# Patient Record
Sex: FEMALE | Race: WHITE | NOT HISPANIC OR LATINO
[De-identification: names, ages, dates, MRNs, and addresses within clinical notes are randomized per-mention and may not be internally consistent; named-entity substitution may affect disease eponyms.]

---

## 2017-03-31 ENCOUNTER — APPOINTMENT (OUTPATIENT)
Dept: INTERNAL MEDICINE | Facility: CLINIC | Age: 47
End: 2017-03-31

## 2018-10-21 ENCOUNTER — TRANSCRIPTION ENCOUNTER (OUTPATIENT)
Age: 48
End: 2018-10-21

## 2019-01-22 ENCOUNTER — TRANSCRIPTION ENCOUNTER (OUTPATIENT)
Age: 49
End: 2019-01-22

## 2019-01-22 ENCOUNTER — APPOINTMENT (OUTPATIENT)
Dept: INTERNAL MEDICINE | Facility: CLINIC | Age: 49
End: 2019-01-22

## 2019-01-22 ENCOUNTER — APPOINTMENT (OUTPATIENT)
Dept: INTERNAL MEDICINE | Facility: CLINIC | Age: 49
End: 2019-01-22
Payer: COMMERCIAL

## 2019-01-22 ENCOUNTER — NON-APPOINTMENT (OUTPATIENT)
Age: 49
End: 2019-01-22

## 2019-01-22 VITALS
RESPIRATION RATE: 14 BRPM | OXYGEN SATURATION: 98 % | HEIGHT: 65 IN | SYSTOLIC BLOOD PRESSURE: 140 MMHG | HEART RATE: 78 BPM | BODY MASS INDEX: 30.49 KG/M2 | TEMPERATURE: 98.5 F | WEIGHT: 183 LBS | DIASTOLIC BLOOD PRESSURE: 70 MMHG

## 2019-01-22 VITALS — DIASTOLIC BLOOD PRESSURE: 70 MMHG | SYSTOLIC BLOOD PRESSURE: 110 MMHG

## 2019-01-22 PROCEDURE — 93000 ELECTROCARDIOGRAM COMPLETE: CPT

## 2019-01-22 PROCEDURE — 99386 PREV VISIT NEW AGE 40-64: CPT | Mod: 25

## 2019-01-22 NOTE — PHYSICAL EXAM

## 2019-01-22 NOTE — HEALTH RISK ASSESSMENT
[Very Good] : ~his/her~ current health as very good [Fair] :  ~his/her~ mood as fair [FreeTextEntry1] :  sick with brain tumor [Patient reported mammogram was normal] : Patient reported mammogram was normal [Patient reported PAP Smear was normal] : Patient reported PAP Smear was normal [MammogramDate] : 12/18 [PapSmearDate] : 12/18

## 2019-01-23 LAB
25(OH)D3 SERPL-MCNC: 33.3 NG/ML
ALBUMIN SERPL ELPH-MCNC: 4.5 G/DL
ALP BLD-CCNC: 56 U/L
ALT SERPL-CCNC: 18 U/L
ANION GAP SERPL CALC-SCNC: 18 MMOL/L
APPEARANCE: CLEAR
AST SERPL-CCNC: 25 U/L
BACTERIA: NEGATIVE
BASOPHILS # BLD AUTO: 0.08 K/UL
BASOPHILS NFR BLD AUTO: 1.2 %
BILIRUB SERPL-MCNC: 0.5 MG/DL
BILIRUBIN URINE: NEGATIVE
BLOOD URINE: NEGATIVE
BUN SERPL-MCNC: 13 MG/DL
CALCIUM SERPL-MCNC: 10 MG/DL
CHLORIDE SERPL-SCNC: 94 MMOL/L
CHOLEST SERPL-MCNC: 351 MG/DL
CHOLEST/HDLC SERPL: 4.2 RATIO
CO2 SERPL-SCNC: 24 MMOL/L
COLOR: YELLOW
CREAT SERPL-MCNC: 0.86 MG/DL
EOSINOPHIL # BLD AUTO: 0.36 K/UL
EOSINOPHIL NFR BLD AUTO: 5.2 %
FOLATE SERPL-MCNC: 12.4 NG/ML
GLUCOSE QUALITATIVE U: NEGATIVE MG/DL
GLUCOSE SERPL-MCNC: 77 MG/DL
HBA1C MFR BLD HPLC: 5.2 %
HCT VFR BLD CALC: 40.7 %
HDLC SERPL-MCNC: 83 MG/DL
HGB BLD-MCNC: 14.2 G/DL
IMM GRANULOCYTES NFR BLD AUTO: 0.1 %
KETONES URINE: ABNORMAL
LDLC SERPL CALC-MCNC: 249 MG/DL
LEUKOCYTE ESTERASE URINE: NEGATIVE
LYMPHOCYTES # BLD AUTO: 2.26 K/UL
LYMPHOCYTES NFR BLD AUTO: 32.7 %
MAN DIFF?: NORMAL
MCHC RBC-ENTMCNC: 30.6 PG
MCHC RBC-ENTMCNC: 34.9 GM/DL
MCV RBC AUTO: 87.7 FL
MICROSCOPIC-UA: NORMAL
MONOCYTES # BLD AUTO: 0.45 K/UL
MONOCYTES NFR BLD AUTO: 6.5 %
NEUTROPHILS # BLD AUTO: 3.76 K/UL
NEUTROPHILS NFR BLD AUTO: 54.3 %
NITRITE URINE: NEGATIVE
PH URINE: 5.5
PLATELET # BLD AUTO: 330 K/UL
POTASSIUM SERPL-SCNC: 3.7 MMOL/L
PROT SERPL-MCNC: 7.6 G/DL
PROTEIN URINE: NEGATIVE MG/DL
RBC # BLD: 4.64 M/UL
RBC # FLD: 13.4 %
RED BLOOD CELLS URINE: 0 /HPF
SODIUM SERPL-SCNC: 136 MMOL/L
SPECIFIC GRAVITY URINE: 1
SQUAMOUS EPITHELIAL CELLS: 0 /HPF
TRIGL SERPL-MCNC: 93 MG/DL
TSH SERPL-ACNC: 29.61 UIU/ML
UROBILINOGEN URINE: NEGATIVE MG/DL
VIT B12 SERPL-MCNC: 664 PG/ML
WBC # FLD AUTO: 6.92 K/UL
WHITE BLOOD CELLS URINE: 0 /HPF

## 2019-01-24 ENCOUNTER — LABORATORY RESULT (OUTPATIENT)
Age: 49
End: 2019-01-24

## 2019-01-24 LAB
CHOLEST SERPL-MCNC: 338 MG/DL
CHOLEST/HDLC SERPL: 4.4 RATIO
HDLC SERPL-MCNC: 76 MG/DL
LDLC SERPL CALC-MCNC: 244 MG/DL
TRIGL SERPL-MCNC: 88 MG/DL

## 2019-01-25 LAB
T3RU NFR SERPL: 0.94 INDEX
T4 FREE SERPL-MCNC: 1.5 NG/DL
TSH SERPL-ACNC: 21.06 UIU/ML

## 2020-02-25 LAB
BASOPHILS # BLD AUTO: 0.06 K/UL
BASOPHILS NFR BLD AUTO: 0.9 %
EOSINOPHIL # BLD AUTO: 0.13 K/UL
EOSINOPHIL NFR BLD AUTO: 1.9 %
HCT VFR BLD CALC: 41.2 %
HGB BLD-MCNC: 13.5 G/DL
IMM GRANULOCYTES NFR BLD AUTO: 0.3 %
LYMPHOCYTES # BLD AUTO: 1.59 K/UL
LYMPHOCYTES NFR BLD AUTO: 23.5 %
MAN DIFF?: NORMAL
MCHC RBC-ENTMCNC: 29.8 PG
MCHC RBC-ENTMCNC: 32.8 GM/DL
MCV RBC AUTO: 90.9 FL
MONOCYTES # BLD AUTO: 0.39 K/UL
MONOCYTES NFR BLD AUTO: 5.8 %
NEUTROPHILS # BLD AUTO: 4.59 K/UL
NEUTROPHILS NFR BLD AUTO: 67.6 %
PLATELET # BLD AUTO: 294 K/UL
RBC # BLD: 4.53 M/UL
RBC # FLD: 12.5 %
WBC # FLD AUTO: 6.78 K/UL

## 2020-02-26 ENCOUNTER — TRANSCRIPTION ENCOUNTER (OUTPATIENT)
Age: 50
End: 2020-02-26

## 2020-02-26 LAB
25(OH)D3 SERPL-MCNC: 38.9 NG/ML
ALBUMIN SERPL ELPH-MCNC: 4.7 G/DL
ALP BLD-CCNC: 63 U/L
ALT SERPL-CCNC: 15 U/L
ANION GAP SERPL CALC-SCNC: 15 MMOL/L
APPEARANCE: CLEAR
AST SERPL-CCNC: 21 U/L
BACTERIA: NEGATIVE
BILIRUB SERPL-MCNC: 0.4 MG/DL
BILIRUBIN URINE: NEGATIVE
BLOOD URINE: NEGATIVE
BUN SERPL-MCNC: 18 MG/DL
CALCIUM SERPL-MCNC: 10.1 MG/DL
CHLORIDE SERPL-SCNC: 100 MMOL/L
CHOLEST SERPL-MCNC: 232 MG/DL
CHOLEST/HDLC SERPL: 3 RATIO
CO2 SERPL-SCNC: 26 MMOL/L
COLOR: COLORLESS
CREAT SERPL-MCNC: 0.82 MG/DL
ESTIMATED AVERAGE GLUCOSE: 103 MG/DL
FOLATE SERPL-MCNC: 19 NG/ML
GLUCOSE QUALITATIVE U: NEGATIVE
GLUCOSE SERPL-MCNC: 86 MG/DL
HBA1C MFR BLD HPLC: 5.2 %
HDLC SERPL-MCNC: 79 MG/DL
HYALINE CASTS: 2 /LPF
KETONES URINE: NEGATIVE
LDLC SERPL CALC-MCNC: 135 MG/DL
LEUKOCYTE ESTERASE URINE: NEGATIVE
MICROSCOPIC-UA: NORMAL
NITRITE URINE: NEGATIVE
PH URINE: 5.5
POTASSIUM SERPL-SCNC: 4.9 MMOL/L
PROT SERPL-MCNC: 6.9 G/DL
PROTEIN URINE: NEGATIVE
RED BLOOD CELLS URINE: 1 /HPF
SODIUM SERPL-SCNC: 141 MMOL/L
SPECIFIC GRAVITY URINE: 1.01
SQUAMOUS EPITHELIAL CELLS: 1 /HPF
TRIGL SERPL-MCNC: 91 MG/DL
TSH SERPL-ACNC: 0.2 UIU/ML
UROBILINOGEN URINE: NORMAL
VIT B12 SERPL-MCNC: 826 PG/ML
WHITE BLOOD CELLS URINE: 0 /HPF

## 2020-03-03 ENCOUNTER — APPOINTMENT (OUTPATIENT)
Dept: INTERNAL MEDICINE | Facility: CLINIC | Age: 50
End: 2020-03-03
Payer: COMMERCIAL

## 2020-03-03 ENCOUNTER — NON-APPOINTMENT (OUTPATIENT)
Age: 50
End: 2020-03-03

## 2020-03-03 VITALS
HEART RATE: 68 BPM | BODY MASS INDEX: 25.83 KG/M2 | SYSTOLIC BLOOD PRESSURE: 108 MMHG | TEMPERATURE: 97.8 F | OXYGEN SATURATION: 97 % | RESPIRATION RATE: 14 BRPM | DIASTOLIC BLOOD PRESSURE: 74 MMHG | HEIGHT: 65 IN | WEIGHT: 155 LBS

## 2020-03-03 PROCEDURE — 93000 ELECTROCARDIOGRAM COMPLETE: CPT

## 2020-03-03 PROCEDURE — 99396 PREV VISIT EST AGE 40-64: CPT | Mod: 25

## 2020-03-03 NOTE — PHYSICAL EXAM
[No Acute Distress] : no acute distress [Well Nourished] : well nourished [Well Developed] : well developed [Well-Appearing] : well-appearing [Normal Sclera/Conjunctiva] : normal sclera/conjunctiva [PERRL] : pupils equal round and reactive to light [EOMI] : extraocular movements intact [Normal Outer Ear/Nose] : the outer ears and nose were normal in appearance [Normal Oropharynx] : the oropharynx was normal [No JVD] : no jugular venous distention [No Lymphadenopathy] : no lymphadenopathy [Supple] : supple [Thyroid Normal, No Nodules] : the thyroid was normal and there were no nodules present [No Respiratory Distress] : no respiratory distress  [No Accessory Muscle Use] : no accessory muscle use [Clear to Auscultation] : lungs were clear to auscultation bilaterally [Normal Rate] : normal rate  [Normal S1, S2] : normal S1 and S2 [Regular Rhythm] : with a regular rhythm [No Abdominal Bruit] : a ~M bruit was not heard ~T in the abdomen [No Carotid Bruits] : no carotid bruits [No Murmur] : no murmur heard [Pedal Pulses Present] : the pedal pulses are present [No Edema] : there was no peripheral edema [No Varicosities] : no varicosities [No Extremity Clubbing/Cyanosis] : no extremity clubbing/cyanosis [Soft] : abdomen soft [No Palpable Aorta] : no palpable aorta [Non Tender] : non-tender [Non-distended] : non-distended [No Masses] : no abdominal mass palpated [Normal Bowel Sounds] : normal bowel sounds [Normal Posterior Cervical Nodes] : no posterior cervical lymphadenopathy [No HSM] : no HSM [No Spinal Tenderness] : no spinal tenderness [No CVA Tenderness] : no CVA  tenderness [Normal Anterior Cervical Nodes] : no anterior cervical lymphadenopathy [No Rash] : no rash [No Joint Swelling] : no joint swelling [Grossly Normal Strength/Tone] : grossly normal strength/tone [No Focal Deficits] : no focal deficits [Deep Tendon Reflexes (DTR)] : deep tendon reflexes were 2+ and symmetric [Normal Gait] : normal gait [Coordination Grossly Intact] : coordination grossly intact [Normal Affect] : the affect was normal [Normal Insight/Judgement] : insight and judgment were intact

## 2020-03-03 NOTE — ASSESSMENT
[FreeTextEntry1] : Hypothyroidism- follow up with endo ( may need adjustment of thyroid)\par needs colonoscopy

## 2020-03-04 LAB — HEMOCCULT STL QL IA: NEGATIVE

## 2020-06-03 ENCOUNTER — APPOINTMENT (OUTPATIENT)
Dept: INTERNAL MEDICINE | Facility: CLINIC | Age: 50
End: 2020-06-03
Payer: COMMERCIAL

## 2020-06-03 VITALS
DIASTOLIC BLOOD PRESSURE: 78 MMHG | RESPIRATION RATE: 14 BRPM | SYSTOLIC BLOOD PRESSURE: 122 MMHG | BODY MASS INDEX: 26.16 KG/M2 | WEIGHT: 157 LBS | HEART RATE: 72 BPM | TEMPERATURE: 98.4 F | OXYGEN SATURATION: 98 % | HEIGHT: 65 IN

## 2020-06-03 DIAGNOSIS — L23.7 ALLERGIC CONTACT DERMATITIS DUE TO PLANTS, EXCEPT FOOD: ICD-10-CM

## 2020-06-03 PROCEDURE — 99214 OFFICE O/P EST MOD 30 MIN: CPT

## 2020-06-03 NOTE — PHYSICAL EXAM
[No Acute Distress] : no acute distress [PERRL] : pupils equal round and reactive to light [Normal Sclera/Conjunctiva] : normal sclera/conjunctiva [EOMI] : extraocular movements intact [Normal Affect] : the affect was normal [Normal Gait] : normal gait [No Joint Swelling] : no joint swelling [Normal Insight/Judgement] : insight and judgment were intact [de-identified] : fine maculopapular rash on bilateral LE, abdomen and arms

## 2020-06-03 NOTE — REVIEW OF SYSTEMS
[Fever] : no fever [Chills] : no chills [Night Sweats] : no night sweats [Chest Pain] : no chest pain [Palpitations] : no palpitations [Lower Ext Edema] : no lower extremity edema [Shortness Of Breath] : no shortness of breath [Wheezing] : no wheezing [Cough] : no cough [Dyspnea on Exertion] : no dyspnea on exertion [Abdominal Pain] : no abdominal pain [Nausea] : no nausea [Constipation] : no constipation [Diarrhea] : diarrhea [Vomiting] : no vomiting [Dysuria] : no dysuria

## 2020-06-03 NOTE — ASSESSMENT
[FreeTextEntry1] : Poison ivy dermatitis: Denies any new medications, soaps, foods, clothing. Has poison ivy in backyard. Discussed RBA of prednisone taper. Stressed that she should follow course as prescribed to avoid AI. Follow-up if persistent. \par

## 2020-06-03 NOTE — HISTORY OF PRESENT ILLNESS
[Rash] : rash [Initial Evaluation] : an initial evaluation of [Urticaria] : urticaria [Currently Experiencing] : The patient is currently experiencing symptoms. [Bilateral Trunk] : trunk bilaterally [Bilateral Arm] : arms bilaterally [Bilateral Leg] : legs bilaterally [Red] : red [Itchy] : itchy [Gradual] : gradual [___ Week(s) Ago] : [unfilled] week(s) ago [Moderate] : moderate in severity [Spreading] : spreading [Difficulty Breathing] : no difficulty breathing [Difficulty Swallowing] : no difficulty swallowing [Antihistamines] : not relieved by antihistamines [de-identified] : poison ivy

## 2020-12-15 ENCOUNTER — APPOINTMENT (OUTPATIENT)
Dept: INTERNAL MEDICINE | Facility: CLINIC | Age: 50
End: 2020-12-15
Payer: COMMERCIAL

## 2020-12-15 VITALS
WEIGHT: 170 LBS | DIASTOLIC BLOOD PRESSURE: 80 MMHG | SYSTOLIC BLOOD PRESSURE: 124 MMHG | TEMPERATURE: 97.3 F | HEART RATE: 70 BPM | BODY MASS INDEX: 28.32 KG/M2 | OXYGEN SATURATION: 99 % | HEIGHT: 65 IN | RESPIRATION RATE: 14 BRPM

## 2020-12-15 PROCEDURE — 81003 URINALYSIS AUTO W/O SCOPE: CPT | Mod: QW

## 2020-12-15 PROCEDURE — 99072 ADDL SUPL MATRL&STAF TM PHE: CPT

## 2020-12-15 PROCEDURE — 99214 OFFICE O/P EST MOD 30 MIN: CPT | Mod: 25

## 2020-12-15 NOTE — HISTORY OF PRESENT ILLNESS
[FreeTextEntry8] : Pt with symptoms of lower abdominal/pelvic pressure/discomfort for  2-3 days. Pt reports these symptoms are typical of uti for her. Also small amount of blood noted. No fever/chills.

## 2020-12-16 LAB
APPEARANCE: CLEAR
BACTERIA: ABNORMAL
BILIRUBIN URINE: NEGATIVE
BLOOD URINE: NEGATIVE
COLOR: COLORLESS
GLUCOSE QUALITATIVE U: NEGATIVE
HYALINE CASTS: 0 /LPF
KETONES URINE: NEGATIVE
LEUKOCYTE ESTERASE URINE: NEGATIVE
MICROSCOPIC-UA: NORMAL
NITRITE URINE: NEGATIVE
PH URINE: 5.5
PROTEIN URINE: NEGATIVE
RED BLOOD CELLS URINE: 3 /HPF
SPECIFIC GRAVITY URINE: 1
SQUAMOUS EPITHELIAL CELLS: 0 /HPF
UROBILINOGEN URINE: NORMAL
WHITE BLOOD CELLS URINE: 2 /HPF

## 2020-12-18 DIAGNOSIS — N30.00 ACUTE CYSTITIS W/OUT HEMATURIA: ICD-10-CM

## 2020-12-18 LAB — BACTERIA UR CULT: ABNORMAL

## 2020-12-18 RX ORDER — NITROFURANTOIN (MONOHYDRATE/MACROCRYSTALS) 25; 75 MG/1; MG/1
100 CAPSULE ORAL
Qty: 10 | Refills: 0 | Status: DISCONTINUED | COMMUNITY
Start: 2020-12-15 | End: 2020-12-18

## 2021-04-08 ENCOUNTER — APPOINTMENT (OUTPATIENT)
Dept: INTERNAL MEDICINE | Facility: CLINIC | Age: 51
End: 2021-04-08
Payer: COMMERCIAL

## 2021-04-08 ENCOUNTER — NON-APPOINTMENT (OUTPATIENT)
Age: 51
End: 2021-04-08

## 2021-04-08 VITALS
RESPIRATION RATE: 14 BRPM | BODY MASS INDEX: 26.49 KG/M2 | HEIGHT: 65 IN | DIASTOLIC BLOOD PRESSURE: 80 MMHG | SYSTOLIC BLOOD PRESSURE: 118 MMHG | TEMPERATURE: 97.6 F | OXYGEN SATURATION: 97 % | WEIGHT: 159 LBS | HEART RATE: 57 BPM

## 2021-04-08 PROCEDURE — 99396 PREV VISIT EST AGE 40-64: CPT | Mod: 25

## 2021-04-08 PROCEDURE — 99072 ADDL SUPL MATRL&STAF TM PHE: CPT

## 2021-04-08 PROCEDURE — 93000 ELECTROCARDIOGRAM COMPLETE: CPT

## 2021-04-08 NOTE — HEALTH RISK ASSESSMENT
[Good] : ~his/her~  mood as  good [No falls in past year] : Patient reported no falls in the past year [Patient reported mammogram was normal] : Patient reported mammogram was normal [Patient reported PAP Smear was normal] : Patient reported PAP Smear was normal [MammogramDate] : 04/21 [PapSmearDate] : 04/21

## 2021-04-09 LAB
25(OH)D3 SERPL-MCNC: 33.7 NG/ML
ALBUMIN SERPL ELPH-MCNC: 4.7 G/DL
ALP BLD-CCNC: 62 U/L
ALT SERPL-CCNC: 12 U/L
ANION GAP SERPL CALC-SCNC: 11 MMOL/L
APPEARANCE: CLEAR
AST SERPL-CCNC: 17 U/L
BACTERIA: NEGATIVE
BASOPHILS # BLD AUTO: 0.05 K/UL
BASOPHILS NFR BLD AUTO: 1 %
BILIRUB SERPL-MCNC: 0.4 MG/DL
BILIRUBIN URINE: NEGATIVE
BLOOD URINE: NEGATIVE
BUN SERPL-MCNC: 12 MG/DL
CALCIUM SERPL-MCNC: 10 MG/DL
CHLORIDE SERPL-SCNC: 102 MMOL/L
CHOLEST SERPL-MCNC: 259 MG/DL
CO2 SERPL-SCNC: 27 MMOL/L
COLOR: COLORLESS
CREAT SERPL-MCNC: 0.87 MG/DL
EOSINOPHIL # BLD AUTO: 0.12 K/UL
EOSINOPHIL NFR BLD AUTO: 2.3 %
ESTIMATED AVERAGE GLUCOSE: 100 MG/DL
FOLATE SERPL-MCNC: 17.1 NG/ML
GLUCOSE QUALITATIVE U: NEGATIVE
GLUCOSE SERPL-MCNC: 90 MG/DL
HBA1C MFR BLD HPLC: 5.1 %
HCT VFR BLD CALC: 42.1 %
HDLC SERPL-MCNC: 82 MG/DL
HGB BLD-MCNC: 14.3 G/DL
HYALINE CASTS: 0 /LPF
IMM GRANULOCYTES NFR BLD AUTO: 0.2 %
KETONES URINE: NEGATIVE
LDLC SERPL CALC-MCNC: 156 MG/DL
LEUKOCYTE ESTERASE URINE: NEGATIVE
LYMPHOCYTES # BLD AUTO: 1.89 K/UL
LYMPHOCYTES NFR BLD AUTO: 36.1 %
MAN DIFF?: NORMAL
MCHC RBC-ENTMCNC: 30.4 PG
MCHC RBC-ENTMCNC: 34 GM/DL
MCV RBC AUTO: 89.6 FL
MICROSCOPIC-UA: NORMAL
MONOCYTES # BLD AUTO: 0.4 K/UL
MONOCYTES NFR BLD AUTO: 7.6 %
NEUTROPHILS # BLD AUTO: 2.76 K/UL
NEUTROPHILS NFR BLD AUTO: 52.8 %
NITRITE URINE: NEGATIVE
NONHDLC SERPL-MCNC: 176 MG/DL
PH URINE: 6
PLATELET # BLD AUTO: 279 K/UL
POTASSIUM SERPL-SCNC: 4.8 MMOL/L
PROT SERPL-MCNC: 7 G/DL
PROTEIN URINE: NEGATIVE
RBC # BLD: 4.7 M/UL
RBC # FLD: 12.4 %
RED BLOOD CELLS URINE: 0 /HPF
SODIUM SERPL-SCNC: 140 MMOL/L
SPECIFIC GRAVITY URINE: 1
SQUAMOUS EPITHELIAL CELLS: 0 /HPF
TRIGL SERPL-MCNC: 104 MG/DL
TSH SERPL-ACNC: 0.1 UIU/ML
UROBILINOGEN URINE: NORMAL
VIT B12 SERPL-MCNC: 509 PG/ML
WBC # FLD AUTO: 5.23 K/UL
WHITE BLOOD CELLS URINE: 0 /HPF

## 2021-04-09 RX ORDER — PREDNISONE 10 MG/1
10 TABLET ORAL
Qty: 60 | Refills: 0 | Status: DISCONTINUED | COMMUNITY
Start: 2020-06-03 | End: 2021-04-09

## 2021-07-08 ENCOUNTER — APPOINTMENT (OUTPATIENT)
Dept: NEUROLOGY | Facility: CLINIC | Age: 51
End: 2021-07-08
Payer: COMMERCIAL

## 2021-07-08 VITALS
DIASTOLIC BLOOD PRESSURE: 82 MMHG | SYSTOLIC BLOOD PRESSURE: 122 MMHG | WEIGHT: 159 LBS | HEART RATE: 62 BPM | HEIGHT: 65 IN | BODY MASS INDEX: 26.49 KG/M2

## 2021-07-08 DIAGNOSIS — G43.109 MIGRAINE WITH AURA, NOT INTRACTABLE, W/OUT STATUS MIGRAINOSUS: ICD-10-CM

## 2021-07-08 DIAGNOSIS — R51.9 HEADACHE, UNSPECIFIED: ICD-10-CM

## 2021-07-08 PROCEDURE — 99204 OFFICE O/P NEW MOD 45 MIN: CPT

## 2021-07-08 PROCEDURE — 99072 ADDL SUPL MATRL&STAF TM PHE: CPT

## 2021-07-13 NOTE — PHYSICAL EXAM
[Person] : oriented to person [Place] : oriented to place [Time] : oriented to time [Concentration Intact] : normal concentrating ability [Repeating Phrases] : no difficulty repeating a phrase [Fluency] : fluency intact [Comprehension] : comprehension intact [Past History] : adequate knowledge of personal past history [Cranial Nerves Optic (II)] : visual acuity intact bilaterally,  visual fields full to confrontation, pupils equal round and reactive to light [Cranial Nerves Oculomotor (III)] : extraocular motion intact [Cranial Nerves Trigeminal (V)] : facial sensation intact symmetrically [Cranial Nerves Facial (VII)] : face symmetrical [Cranial Nerves Vestibulocochlear (VIII)] : hearing was intact bilaterally [Cranial Nerves Glossopharyngeal (IX)] : tongue and palate midline [Cranial Nerves Accessory (XI - Cranial And Spinal)] : head turning and shoulder shrug symmetric [Cranial Nerves Hypoglossal (XII)] : there was no tongue deviation with protrusion [Motor Tone] : muscle tone was normal in all four extremities [Motor Strength] : muscle strength was normal in all four extremities [No Muscle Atrophy] : normal bulk in all four extremities [Sensation Tactile Decrease] : light touch was intact [Abnormal Walk] : normal gait [Balance] : balance was intact [2+] : Ankle jerk left 2+ [General Appearance - In No Acute Distress] : in no acute distress [Affect] : the affect was normal [Mood] : the mood was normal [Short Term Intact] : short term memory intact [Registration Intact] : recent registration memory intact [PERRL With Normal Accommodation] : pupils were equal in size, round, reactive to light, with normal accommodation [Extraocular Movements] : extraocular movements were intact [Past-pointing] : there was no past-pointing [Tremor] : no tremor present [Plantar Reflex Right Only] : normal on the right [Plantar Reflex Left Only] : normal on the left

## 2021-07-13 NOTE — ASSESSMENT
[FreeTextEntry1] : 50 year old female with PMHx of graves disease, migraine since age 13  with new quality headache. neuro exam is normal neuro exam. will obtain imaging for changes to her pattern\par she does not wish to try medications at this time. \par Brain MRI\par f/u after testing

## 2021-07-13 NOTE — HISTORY OF PRESENT ILLNESS
[FreeTextEntry1] : 50 year old female with PMHx of graves disease, migraines presents today for evaluation of her headaches.\par \par She reports 6 yrs ago she has been having intermittent one sided sharp stabbing pains to her head that last less than 10 seconds. there is no specific distribution, but occurs mainly to parietal.  \par She notes past two months they have been occurring 3-4x/week  she does  not know what triggers them. never evaluated for them. \par Timing of headache: varies\par Associated symptoms: photophobia, phonophobia, dizziness, anosmia, \par Pertinent negatives: blurred vision,visual aura, scotoma, memory impairment, neck stiffness, nausea\par Aggravated Factors: none\par Relived by self limiting \par \par she reports having migraines at  age 13 after her menses. occurs 2x/ year. usual migraines starts with kaleidoscope vision followed by once sided HA associated with ?light sensitivity, nausea, no vomiting. no additional associated symptoms.  takes Advil at onset and sleep it off. 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

## 2021-07-23 ENCOUNTER — APPOINTMENT (OUTPATIENT)
Dept: MRI IMAGING | Facility: CLINIC | Age: 51
End: 2021-07-23
Payer: COMMERCIAL

## 2021-07-23 ENCOUNTER — OUTPATIENT (OUTPATIENT)
Dept: OUTPATIENT SERVICES | Facility: HOSPITAL | Age: 51
LOS: 1 days | End: 2021-07-23
Payer: COMMERCIAL

## 2021-07-23 DIAGNOSIS — G43.109 MIGRAINE WITH AURA, NOT INTRACTABLE, WITHOUT STATUS MIGRAINOSUS: ICD-10-CM

## 2021-07-23 PROCEDURE — 70551 MRI BRAIN STEM W/O DYE: CPT | Mod: 26

## 2021-07-23 PROCEDURE — 70551 MRI BRAIN STEM W/O DYE: CPT

## 2021-07-28 ENCOUNTER — TRANSCRIPTION ENCOUNTER (OUTPATIENT)
Age: 51
End: 2021-07-28

## 2022-01-11 ENCOUNTER — NON-APPOINTMENT (OUTPATIENT)
Age: 52
End: 2022-01-11

## 2022-01-12 ENCOUNTER — APPOINTMENT (OUTPATIENT)
Dept: CARDIOLOGY | Facility: CLINIC | Age: 52
End: 2022-01-12
Payer: COMMERCIAL

## 2022-01-12 ENCOUNTER — NON-APPOINTMENT (OUTPATIENT)
Age: 52
End: 2022-01-12

## 2022-01-12 VITALS
BODY MASS INDEX: 27.32 KG/M2 | SYSTOLIC BLOOD PRESSURE: 123 MMHG | DIASTOLIC BLOOD PRESSURE: 81 MMHG | HEIGHT: 65 IN | HEART RATE: 64 BPM | OXYGEN SATURATION: 100 % | WEIGHT: 164 LBS

## 2022-01-12 PROCEDURE — 99203 OFFICE O/P NEW LOW 30 MIN: CPT

## 2022-01-12 PROCEDURE — 93000 ELECTROCARDIOGRAM COMPLETE: CPT

## 2022-01-12 RX ORDER — LEVOTHYROXINE SODIUM 0.09 MG/1
88 TABLET ORAL
Refills: 0 | Status: ACTIVE | COMMUNITY
Start: 2022-01-12

## 2022-01-12 RX ORDER — SULFAMETHOXAZOLE AND TRIMETHOPRIM 800; 160 MG/1; MG/1
800-160 TABLET ORAL TWICE DAILY
Qty: 6 | Refills: 0 | Status: DISCONTINUED | COMMUNITY
Start: 2020-12-18 | End: 2022-01-12

## 2022-01-13 ENCOUNTER — NON-APPOINTMENT (OUTPATIENT)
Age: 52
End: 2022-01-13

## 2022-01-13 NOTE — HISTORY OF PRESENT ILLNESS
[FreeTextEntry1] : Barbi is seeing me for the symptoms of palpitations\par these started a few months ago\par Occurr randomly and are described a a group of fast beats\par They do not make her pass out, get dizzy or lightheaded or have CP\par No LE edema\par No ORTIZ or orthopnea\par No new supplements\par Does not drink excessive caffeine and does not use supplements\par

## 2022-01-13 NOTE — DISCUSSION/SUMMARY
[FreeTextEntry1] : Random palpitations - currently have improved on their own\par Recc check Echo to Eval structural heart\par Advised her to contact the office if her palps re-increase for a 1 week event monitor.

## 2022-01-27 ENCOUNTER — APPOINTMENT (OUTPATIENT)
Dept: CARDIOLOGY | Facility: CLINIC | Age: 52
End: 2022-01-27
Payer: COMMERCIAL

## 2022-01-27 DIAGNOSIS — R01.1 CARDIAC MURMUR, UNSPECIFIED: ICD-10-CM

## 2022-01-27 PROCEDURE — 93306 TTE W/DOPPLER COMPLETE: CPT

## 2022-01-29 PROBLEM — R01.1 MURMUR: Status: ACTIVE | Noted: 2022-01-29

## 2022-04-28 ENCOUNTER — APPOINTMENT (OUTPATIENT)
Dept: INTERNAL MEDICINE | Facility: CLINIC | Age: 52
End: 2022-04-28
Payer: COMMERCIAL

## 2022-04-28 ENCOUNTER — LABORATORY RESULT (OUTPATIENT)
Age: 52
End: 2022-04-28

## 2022-04-28 ENCOUNTER — NON-APPOINTMENT (OUTPATIENT)
Age: 52
End: 2022-04-28

## 2022-04-28 VITALS
OXYGEN SATURATION: 98 % | WEIGHT: 162 LBS | DIASTOLIC BLOOD PRESSURE: 80 MMHG | HEART RATE: 68 BPM | HEIGHT: 65 IN | BODY MASS INDEX: 26.99 KG/M2 | TEMPERATURE: 97.4 F | SYSTOLIC BLOOD PRESSURE: 122 MMHG | RESPIRATION RATE: 14 BRPM

## 2022-04-28 DIAGNOSIS — R92.2 INCONCLUSIVE MAMMOGRAM: ICD-10-CM

## 2022-04-28 PROCEDURE — 99396 PREV VISIT EST AGE 40-64: CPT

## 2022-04-28 NOTE — HEALTH RISK ASSESSMENT
[Very Good] : ~his/her~  mood as very good [No falls in past year] : Patient reported no falls in the past year [0] : 2) Feeling down, depressed, or hopeless: Not at all (0) [Patient reported mammogram was normal] : Patient reported mammogram was normal [MammogramDate] : 04/21

## 2022-04-28 NOTE — ASSESSMENT
[FreeTextEntry1] : HCM- needs mammogram and us breast\par check labs today Bed in lowest position, wheels locked, appropriate side rails in place/Call bell, personal items and telephone in reach/Instruct patient to call for assistance before getting out of bed or chair/Non-slip footwear when patient is out of bed/[place] to call system/Physically safe environment - no spills, clutter or unnecessary equipment/Purposeful Proactive Rounding/Room/bathroom lighting operational, light cord in reach

## 2022-04-29 LAB
25(OH)D3 SERPL-MCNC: 77.2 NG/ML
ALBUMIN SERPL ELPH-MCNC: 4.7 G/DL
ALP BLD-CCNC: 66 U/L
ALT SERPL-CCNC: 14 U/L
ANION GAP SERPL CALC-SCNC: 12 MMOL/L
APPEARANCE: CLEAR
AST SERPL-CCNC: 20 U/L
BACTERIA: NEGATIVE
BASOPHILS # BLD AUTO: 0.05 K/UL
BASOPHILS NFR BLD AUTO: 0.9 %
BILIRUB SERPL-MCNC: 0.5 MG/DL
BILIRUBIN URINE: NEGATIVE
BLOOD URINE: NEGATIVE
BUN SERPL-MCNC: 12 MG/DL
CALCIUM SERPL-MCNC: 9.9 MG/DL
CHLORIDE SERPL-SCNC: 99 MMOL/L
CHOLEST SERPL-MCNC: 277 MG/DL
CO2 SERPL-SCNC: 27 MMOL/L
COLOR: COLORLESS
CREAT SERPL-MCNC: 0.87 MG/DL
EGFR: 80 ML/MIN/1.73M2
EOSINOPHIL # BLD AUTO: 0.14 K/UL
EOSINOPHIL NFR BLD AUTO: 2.5 %
ESTIMATED AVERAGE GLUCOSE: 108 MG/DL
FOLATE SERPL-MCNC: 17.4 NG/ML
GLUCOSE QUALITATIVE U: NEGATIVE
GLUCOSE SERPL-MCNC: 83 MG/DL
HBA1C MFR BLD HPLC: 5.4 %
HCT VFR BLD CALC: 43.7 %
HDLC SERPL-MCNC: 89 MG/DL
HGB BLD-MCNC: 15 G/DL
HYALINE CASTS: 0 /LPF
IMM GRANULOCYTES NFR BLD AUTO: 0.2 %
KETONES URINE: NEGATIVE
LDLC SERPL CALC-MCNC: 170 MG/DL
LEUKOCYTE ESTERASE URINE: NEGATIVE
LYMPHOCYTES # BLD AUTO: 2.04 K/UL
LYMPHOCYTES NFR BLD AUTO: 36.4 %
MAN DIFF?: NORMAL
MCHC RBC-ENTMCNC: 31.1 PG
MCHC RBC-ENTMCNC: 34.3 GM/DL
MCV RBC AUTO: 90.5 FL
MICROSCOPIC-UA: NORMAL
MONOCYTES # BLD AUTO: 0.37 K/UL
MONOCYTES NFR BLD AUTO: 6.6 %
NEUTROPHILS # BLD AUTO: 3 K/UL
NEUTROPHILS NFR BLD AUTO: 53.4 %
NITRITE URINE: NEGATIVE
NONHDLC SERPL-MCNC: 188 MG/DL
PH URINE: 5.5
PLATELET # BLD AUTO: 304 K/UL
POTASSIUM SERPL-SCNC: 4.8 MMOL/L
PROT SERPL-MCNC: 7 G/DL
PROTEIN URINE: NEGATIVE
RBC # BLD: 4.83 M/UL
RBC # FLD: 12.2 %
RED BLOOD CELLS URINE: 0 /HPF
SODIUM SERPL-SCNC: 138 MMOL/L
SPECIFIC GRAVITY URINE: 1
SQUAMOUS EPITHELIAL CELLS: 0 /HPF
T3RU NFR SERPL: 1.1 TBI
T4 FREE SERPL-MCNC: 1.4 NG/DL
TRIGL SERPL-MCNC: 91 MG/DL
UROBILINOGEN URINE: NORMAL
VIT B12 SERPL-MCNC: 516 PG/ML
WBC # FLD AUTO: 5.61 K/UL
WHITE BLOOD CELLS URINE: 0 /HPF

## 2022-05-03 LAB — TSH SERPL-ACNC: 9.49 UIU/ML

## 2022-05-16 ENCOUNTER — TRANSCRIPTION ENCOUNTER (OUTPATIENT)
Age: 52
End: 2022-05-16

## 2022-05-17 ENCOUNTER — TRANSCRIPTION ENCOUNTER (OUTPATIENT)
Age: 52
End: 2022-05-17

## 2022-05-19 DIAGNOSIS — R92.8 OTHER ABNORMAL AND INCONCLUSIVE FINDINGS ON DIAGNOSTIC IMAGING OF BREAST: ICD-10-CM

## 2023-05-03 ENCOUNTER — APPOINTMENT (OUTPATIENT)
Dept: INTERNAL MEDICINE | Facility: CLINIC | Age: 53
End: 2023-05-03
Payer: COMMERCIAL

## 2023-05-03 ENCOUNTER — LABORATORY RESULT (OUTPATIENT)
Age: 53
End: 2023-05-03

## 2023-05-03 ENCOUNTER — NON-APPOINTMENT (OUTPATIENT)
Age: 53
End: 2023-05-03

## 2023-05-03 VITALS
WEIGHT: 165 LBS | RESPIRATION RATE: 14 BRPM | TEMPERATURE: 98.2 F | HEART RATE: 69 BPM | BODY MASS INDEX: 27.49 KG/M2 | HEIGHT: 65 IN | OXYGEN SATURATION: 99 % | DIASTOLIC BLOOD PRESSURE: 80 MMHG | SYSTOLIC BLOOD PRESSURE: 112 MMHG

## 2023-05-03 DIAGNOSIS — E03.9 HYPOTHYROIDISM, UNSPECIFIED: ICD-10-CM

## 2023-05-03 DIAGNOSIS — Z00.00 ENCOUNTER FOR GENERAL ADULT MEDICAL EXAMINATION W/OUT ABNORMAL FINDINGS: ICD-10-CM

## 2023-05-03 DIAGNOSIS — R00.2 PALPITATIONS: ICD-10-CM

## 2023-05-03 DIAGNOSIS — E78.5 HYPERLIPIDEMIA, UNSPECIFIED: ICD-10-CM

## 2023-05-03 PROCEDURE — 93000 ELECTROCARDIOGRAM COMPLETE: CPT

## 2023-05-03 PROCEDURE — 99396 PREV VISIT EST AGE 40-64: CPT | Mod: 25

## 2023-05-03 NOTE — HEALTH RISK ASSESSMENT
[Very Good] : ~his/her~ current health as very good [Fair] :  ~his/her~ mood as fair [FreeTextEntry1] :  passed [No falls in past year] : Patient reported no falls in the past year [1] : 2) Feeling down, depressed, or hopeless for several days (1) [[id number]] : 2 [Patient reported mammogram was normal] : Patient reported mammogram was normal [MammogramDate] : 04/22 [ColonoscopyDate] : NEVER

## 2023-05-03 NOTE — HISTORY OF PRESENT ILLNESS
[de-identified] : Depressed due to recent passing of . Going to [facility]. \par Pt followed by endo for underactive thyroid ( dr Navarro)

## 2023-05-04 LAB
25(OH)D3 SERPL-MCNC: 77.5 NG/ML
ALBUMIN SERPL ELPH-MCNC: 4.8 G/DL
ALP BLD-CCNC: 55 U/L
ALT SERPL-CCNC: 19 U/L
ANION GAP SERPL CALC-SCNC: 16 MMOL/L
APPEARANCE: CLEAR
AST SERPL-CCNC: 24 U/L
BACTERIA: NEGATIVE /HPF
BASOPHILS # BLD AUTO: 0.04 K/UL
BASOPHILS NFR BLD AUTO: 0.5 %
BILIRUB SERPL-MCNC: 0.4 MG/DL
BILIRUBIN URINE: NEGATIVE
BLOOD URINE: NEGATIVE
BUN SERPL-MCNC: 15 MG/DL
CALCIUM SERPL-MCNC: 10 MG/DL
CAST: 0 /LPF
CHLORIDE SERPL-SCNC: 101 MMOL/L
CHOLEST SERPL-MCNC: 266 MG/DL
CO2 SERPL-SCNC: 24 MMOL/L
COLOR: YELLOW
CREAT SERPL-MCNC: 0.83 MG/DL
EGFR: 84 ML/MIN/1.73M2
EOSINOPHIL # BLD AUTO: 0.16 K/UL
EOSINOPHIL NFR BLD AUTO: 2.1 %
EPITHELIAL CELLS: 0 /HPF
ESTIMATED AVERAGE GLUCOSE: 100 MG/DL
FOLATE SERPL-MCNC: 17.1 NG/ML
GLUCOSE QUALITATIVE U: NEGATIVE MG/DL
GLUCOSE SERPL-MCNC: 87 MG/DL
HBA1C MFR BLD HPLC: 5.1 %
HCT VFR BLD CALC: 40.8 %
HDLC SERPL-MCNC: 82 MG/DL
HGB BLD-MCNC: 13.8 G/DL
IMM GRANULOCYTES NFR BLD AUTO: 0.1 %
KETONES URINE: NEGATIVE MG/DL
LDLC SERPL CALC-MCNC: 166 MG/DL
LEUKOCYTE ESTERASE URINE: NEGATIVE
LYMPHOCYTES # BLD AUTO: 2.01 K/UL
LYMPHOCYTES NFR BLD AUTO: 26.5 %
MAN DIFF?: NORMAL
MCHC RBC-ENTMCNC: 30.5 PG
MCHC RBC-ENTMCNC: 33.8 GM/DL
MCV RBC AUTO: 90.1 FL
MICROSCOPIC-UA: NORMAL
MONOCYTES # BLD AUTO: 0.41 K/UL
MONOCYTES NFR BLD AUTO: 5.4 %
NEUTROPHILS # BLD AUTO: 4.96 K/UL
NEUTROPHILS NFR BLD AUTO: 65.4 %
NITRITE URINE: NEGATIVE
NONHDLC SERPL-MCNC: 185 MG/DL
PH URINE: 6
PLATELET # BLD AUTO: 355 K/UL
POTASSIUM SERPL-SCNC: 4.7 MMOL/L
PROT SERPL-MCNC: 7.2 G/DL
PROTEIN URINE: NEGATIVE MG/DL
RBC # BLD: 4.53 M/UL
RBC # FLD: 13.1 %
RED BLOOD CELLS URINE: 0 /HPF
SODIUM SERPL-SCNC: 141 MMOL/L
SPECIFIC GRAVITY URINE: 1.01
T3RU NFR SERPL: 0.9 TBI
T4 FREE SERPL-MCNC: 1.9 NG/DL
TRIGL SERPL-MCNC: 95 MG/DL
TSH SERPL-ACNC: 0.46 UIU/ML
UROBILINOGEN URINE: 0.2 MG/DL
VIT B12 SERPL-MCNC: 699 PG/ML
WBC # FLD AUTO: 7.59 K/UL
WHITE BLOOD CELLS URINE: 0 /HPF

## 2024-03-21 NOTE — HEALTH RISK ASSESSMENT
[Very Good] : ~his/her~  mood as very good [Yes] : Yes [0] : 2) Feeling down, depressed, or hopeless: Not at all (0) [Patient reported mammogram was normal] : Patient reported mammogram was normal [MammogramDate] : 01/20 [ColonoscopyComments] : NEVER 98.1

## 2024-03-22 ENCOUNTER — NON-APPOINTMENT (OUTPATIENT)
Age: 54
End: 2024-03-22

## 2024-06-18 ENCOUNTER — APPOINTMENT (OUTPATIENT)
Dept: INTERNAL MEDICINE | Facility: CLINIC | Age: 54
End: 2024-06-18

## 2024-08-15 ENCOUNTER — APPOINTMENT (OUTPATIENT)
Dept: ORTHOPEDIC SURGERY | Facility: CLINIC | Age: 54
End: 2024-08-15

## 2024-08-15 VITALS — BODY MASS INDEX: 27.49 KG/M2 | WEIGHT: 165 LBS | HEIGHT: 65 IN

## 2024-08-15 DIAGNOSIS — S83.281A OTHER TEAR OF LATERAL MENISCUS, CURRENT INJURY, RIGHT KNEE, INITIAL ENCOUNTER: ICD-10-CM

## 2024-08-15 DIAGNOSIS — M17.11 UNILATERAL PRIMARY OSTEOARTHRITIS, RIGHT KNEE: ICD-10-CM

## 2024-08-15 PROCEDURE — 99203 OFFICE O/P NEW LOW 30 MIN: CPT

## 2024-08-15 PROCEDURE — 73562 X-RAY EXAM OF KNEE 3: CPT | Mod: RT

## 2024-08-15 NOTE — PLAN
[TextEntry] : The natural progression of osteoarthritis was explained to the patient.  We discussed the possible treatment options from conservative to operative.  These included NSAIDs, Glucosamine and Chondroitin sulfate, and physical therapy as well different types of injections.  We also discussed that at some point they may progress to need a TKA.  Information and pamphlets were given.   Cortisone injection given today. Medication was injected into the above treated area. After verbal consent using sterile preparation and technique. The risks, benefits, and alternatives to cortisone injection were explained in full to the patient. Risks outlined include but are not limited to infection, sepsis, bleeding, scarring, skin discoloration, temporary increase in pain, syncopal episode, failure to resolve symptoms, allergic reaction, symptom recurrence, and elevation of blood sugar in diabetics. Patient understood the risks. All questions were answered. After discussion of options, patient requested an injection. Oral informed consent was obtained and sterile prep was done of the injection site. Sterile technique was utilized for the procedure including the preparation of the solutions used for the injection. Patient tolerated the procedure well. Advised to ice the injection site this evening. Prep with Betadine locally to site. Sterile technique used. Patient tolerated procedure well. Post Procedure Instructions: Patient was advised to call if redness, pain, or fever occur and apply ice for 15 min. out of every hour for the next 12-24 hours as tolerated.   If no improvement, consider getting an updated MRI.

## 2024-08-15 NOTE — PHYSICAL EXAM
[Normal Mood and Affect] : normal mood and affect [Able to Communicate] : able to communicate [Well Developed] : well developed [Well Nourished] : well nourished [5___] : hamstring 5[unfilled]/5 [Right] : right knee [AP] : anteroposterior [Lateral] : lateral [[place]] : skyline [There are no fractures, subluxations or dislocations. No significant abnormalities are seen] : There are no fractures, subluxations or dislocations. No significant abnormalities are seen [] : non-antalgic [FreeTextEntry3] : Palpable lump along the lateral joint line.  [FreeTextEntry9] : Very mild narrowing medial joint.

## 2024-08-15 NOTE — REASON FOR VISIT
[FreeTextEntry2] : NI---RT knee. Dealing with knee pain for a long while now, started to have more severe pain about 3 weeks ago. Driving and sitting for long periods of time gives the patient discomfort. Denies specific injury/trauma.

## 2024-08-15 NOTE — HISTORY OF PRESENT ILLNESS
[7] : 7 [4] : 4 [Sharp] : sharp [] : yes [Rest] : rest [de-identified] : 8/15/24     Initial visit for this 53 year old female complaining of spontaneous onset of pain and a lump along rt knee x last 3-4 weeks duration. Has pain sitting and driving and walking.  PMH: Had a similar pain x 4-5 years ago, had an MRI c/w torn meniscus, had a cortisone injection which helped.  08/12/19: Returns today with recurrent right knee pain x last 3 weeks duration. Occ. wake up pain at night. Had cortisone in March which helped until now.Takes advil 600mg prn.  3/18/19: Here for recurring RIGHT knee pain x 2 months. Now finding it difficult to drive. Did well with cortisone injection x 6 months ago until now. Requesting repeat injection. Has clicking and giving way episodes.  Also c/o recurring RIGHT thumb pain, constant soreness. Swollen. Difficulty opening things, feels  weakness, does not lift with this hand anymore. She is left handed. Saw Dr. Cerna 2017. 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  9/24/18: Initial visit for this 49 yo female c/o spon. onset of intermittent rt knee pain x last 4-5 months duration. NO hx of trauma. Hurts walking, squatting. Hurts driving, radiating down to rt nakle and sometimes to rt buttock. Denies LBP.Has taken advil 600mg prn which helps. PMH: NO prior knee pain.  IMPRESSION: RIGHT KNEE 2018 1. Horizontal tear of body of the lateral meniscus with lateral gutter parameniscal ganglion. 3. Tricompartmental arthropathy with cartilage thinning; further detailed above. 4. Joint effusion and popliteal cyst. [FreeTextEntry1] : RT knee [FreeTextEntry7] : down and up the leg [de-identified] : Driving

## 2024-09-10 DIAGNOSIS — Z00.00 ENCOUNTER FOR GENERAL ADULT MEDICAL EXAMINATION W/OUT ABNORMAL FINDINGS: ICD-10-CM

## 2024-09-19 LAB
25(OH)D3 SERPL-MCNC: 119 NG/ML
ALBUMIN SERPL ELPH-MCNC: 4.8 G/DL
ALP BLD-CCNC: 57 U/L
ALT SERPL-CCNC: 11 U/L
ANION GAP SERPL CALC-SCNC: 15 MMOL/L
APPEARANCE: CLEAR
AST SERPL-CCNC: 16 U/L
BACTERIA: NEGATIVE /HPF
BASOPHILS # BLD AUTO: 0.05 K/UL
BASOPHILS NFR BLD AUTO: 0.8 %
BILIRUB SERPL-MCNC: 0.5 MG/DL
BILIRUBIN URINE: NEGATIVE
BLOOD URINE: NEGATIVE
BUN SERPL-MCNC: 14 MG/DL
CALCIUM SERPL-MCNC: 9.9 MG/DL
CAST: 0 /LPF
CHLORIDE SERPL-SCNC: 99 MMOL/L
CHOLEST SERPL-MCNC: 266 MG/DL
CO2 SERPL-SCNC: 25 MMOL/L
COLOR: YELLOW
CREAT SERPL-MCNC: 0.93 MG/DL
EGFR: 72 ML/MIN/1.73M2
EOSINOPHIL # BLD AUTO: 0.16 K/UL
EOSINOPHIL NFR BLD AUTO: 2.5 %
EPITHELIAL CELLS: 1 /HPF
ESTIMATED AVERAGE GLUCOSE: 100 MG/DL
FOLATE SERPL-MCNC: 11.9 NG/ML
GLUCOSE QUALITATIVE U: NEGATIVE MG/DL
GLUCOSE SERPL-MCNC: 85 MG/DL
HBA1C MFR BLD HPLC: 5.1 %
HCT VFR BLD CALC: 42.7 %
HDLC SERPL-MCNC: 77 MG/DL
HGB BLD-MCNC: 14.6 G/DL
IMM GRANULOCYTES NFR BLD AUTO: 0.3 %
KETONES URINE: NEGATIVE MG/DL
LDLC SERPL CALC-MCNC: 177 MG/DL
LEUKOCYTE ESTERASE URINE: NEGATIVE
LYMPHOCYTES # BLD AUTO: 2.09 K/UL
LYMPHOCYTES NFR BLD AUTO: 32.6 %
MAN DIFF?: NORMAL
MCHC RBC-ENTMCNC: 30 PG
MCHC RBC-ENTMCNC: 34.2 GM/DL
MCV RBC AUTO: 87.7 FL
MICROSCOPIC-UA: NORMAL
MONOCYTES # BLD AUTO: 0.48 K/UL
MONOCYTES NFR BLD AUTO: 7.5 %
NEUTROPHILS # BLD AUTO: 3.62 K/UL
NEUTROPHILS NFR BLD AUTO: 56.3 %
NITRITE URINE: NEGATIVE
NONHDLC SERPL-MCNC: 189 MG/DL
PH URINE: 6
PLATELET # BLD AUTO: 342 K/UL
POTASSIUM SERPL-SCNC: 5.1 MMOL/L
PROT SERPL-MCNC: 7.2 G/DL
PROTEIN URINE: NEGATIVE MG/DL
RBC # BLD: 4.87 M/UL
RBC # FLD: 12.8 %
RED BLOOD CELLS URINE: 1 /HPF
SODIUM SERPL-SCNC: 139 MMOL/L
SPECIFIC GRAVITY URINE: 1.01
TRIGL SERPL-MCNC: 73 MG/DL
TSH SERPL-ACNC: 5.87 UIU/ML
UROBILINOGEN URINE: 0.2 MG/DL
VIT B12 SERPL-MCNC: 677 PG/ML
WBC # FLD AUTO: 6.42 K/UL
WHITE BLOOD CELLS URINE: 0 /HPF

## 2024-09-25 ENCOUNTER — NON-APPOINTMENT (OUTPATIENT)
Age: 54
End: 2024-09-25

## 2024-09-25 ENCOUNTER — APPOINTMENT (OUTPATIENT)
Dept: INTERNAL MEDICINE | Facility: CLINIC | Age: 54
End: 2024-09-25
Payer: COMMERCIAL

## 2024-09-25 VITALS
SYSTOLIC BLOOD PRESSURE: 120 MMHG | RESPIRATION RATE: 14 BRPM | HEART RATE: 85 BPM | BODY MASS INDEX: 26.16 KG/M2 | TEMPERATURE: 98.5 F | WEIGHT: 157 LBS | HEIGHT: 65 IN | OXYGEN SATURATION: 98 % | DIASTOLIC BLOOD PRESSURE: 80 MMHG

## 2024-09-25 DIAGNOSIS — Z00.00 ENCOUNTER FOR GENERAL ADULT MEDICAL EXAMINATION W/OUT ABNORMAL FINDINGS: ICD-10-CM

## 2024-09-25 DIAGNOSIS — E78.5 HYPERLIPIDEMIA, UNSPECIFIED: ICD-10-CM

## 2024-09-25 DIAGNOSIS — E03.9 HYPOTHYROIDISM, UNSPECIFIED: ICD-10-CM

## 2024-09-25 DIAGNOSIS — Z23 ENCOUNTER FOR IMMUNIZATION: ICD-10-CM

## 2024-09-25 PROCEDURE — G0008: CPT

## 2024-09-25 PROCEDURE — 99396 PREV VISIT EST AGE 40-64: CPT | Mod: 25

## 2024-09-25 PROCEDURE — 93000 ELECTROCARDIOGRAM COMPLETE: CPT

## 2024-09-25 PROCEDURE — 90656 IIV3 VACC NO PRSV 0.5 ML IM: CPT

## 2024-09-25 NOTE — HEALTH RISK ASSESSMENT
[Good] : ~his/her~  mood as  good [No falls in past year] : Patient reported no falls in the past year [0] : 2) Feeling down, depressed, or hopeless: Not at all (0) [[id number]] : 005 [Patient reported mammogram was normal] : Patient reported mammogram was normal [MammogramDate] : 05/24 [ColonoscopyDate] : never

## 2024-11-04 ENCOUNTER — LABORATORY RESULT (OUTPATIENT)
Age: 54
End: 2024-11-04

## 2024-11-19 ENCOUNTER — APPOINTMENT (OUTPATIENT)
Dept: ORTHOPEDIC SURGERY | Facility: CLINIC | Age: 54
End: 2024-11-19
Payer: COMMERCIAL

## 2024-11-19 VITALS — WEIGHT: 145 LBS | BODY MASS INDEX: 24.16 KG/M2 | HEIGHT: 65 IN

## 2024-11-19 DIAGNOSIS — Z86.39 PERSONAL HISTORY OF OTHER ENDOCRINE, NUTRITIONAL AND METABOLIC DISEASE: ICD-10-CM

## 2024-11-19 DIAGNOSIS — M17.11 UNILATERAL PRIMARY OSTEOARTHRITIS, RIGHT KNEE: ICD-10-CM

## 2024-11-19 PROCEDURE — 20610 DRAIN/INJ JOINT/BURSA W/O US: CPT | Mod: RT

## 2024-11-19 PROCEDURE — 99213 OFFICE O/P EST LOW 20 MIN: CPT | Mod: 25

## 2025-01-15 ENCOUNTER — TRANSCRIPTION ENCOUNTER (OUTPATIENT)
Age: 55
End: 2025-01-15

## 2025-03-06 ENCOUNTER — TRANSCRIPTION ENCOUNTER (OUTPATIENT)
Age: 55
End: 2025-03-06

## 2025-03-10 ENCOUNTER — TRANSCRIPTION ENCOUNTER (OUTPATIENT)
Age: 55
End: 2025-03-10

## 2025-04-08 ENCOUNTER — APPOINTMENT (OUTPATIENT)
Dept: ORTHOPEDIC SURGERY | Facility: CLINIC | Age: 55
End: 2025-04-08
Payer: COMMERCIAL

## 2025-04-08 VITALS — WEIGHT: 139 LBS | HEIGHT: 65 IN | BODY MASS INDEX: 23.16 KG/M2

## 2025-04-08 DIAGNOSIS — M17.11 UNILATERAL PRIMARY OSTEOARTHRITIS, RIGHT KNEE: ICD-10-CM

## 2025-04-08 PROCEDURE — 20610 DRAIN/INJ JOINT/BURSA W/O US: CPT | Mod: RT

## 2025-04-08 PROCEDURE — 99213 OFFICE O/P EST LOW 20 MIN: CPT | Mod: 25

## 2025-04-08 RX ORDER — PIROXICAM 20 MG/1
20 CAPSULE ORAL
Qty: 30 | Refills: 4 | Status: ACTIVE | COMMUNITY
Start: 2025-04-08 | End: 1900-01-01

## 2025-05-23 ENCOUNTER — APPOINTMENT (OUTPATIENT)
Dept: ORTHOPEDIC SURGERY | Facility: CLINIC | Age: 55
End: 2025-05-23
Payer: COMMERCIAL

## 2025-05-23 VITALS — WEIGHT: 139 LBS | HEIGHT: 65 IN | BODY MASS INDEX: 23.16 KG/M2

## 2025-05-23 DIAGNOSIS — S83.281A OTHER TEAR OF LATERAL MENISCUS, CURRENT INJURY, RIGHT KNEE, INITIAL ENCOUNTER: ICD-10-CM

## 2025-05-23 DIAGNOSIS — M17.11 UNILATERAL PRIMARY OSTEOARTHRITIS, RIGHT KNEE: ICD-10-CM

## 2025-05-23 PROCEDURE — 99213 OFFICE O/P EST LOW 20 MIN: CPT

## 2025-05-30 ENCOUNTER — APPOINTMENT (OUTPATIENT)
Dept: MRI IMAGING | Facility: CLINIC | Age: 55
End: 2025-05-30
Payer: COMMERCIAL

## 2025-05-30 PROCEDURE — 73721 MRI JNT OF LWR EXTRE W/O DYE: CPT | Mod: RT

## 2025-06-06 ENCOUNTER — APPOINTMENT (OUTPATIENT)
Dept: ORTHOPEDIC SURGERY | Facility: CLINIC | Age: 55
End: 2025-06-06
Payer: COMMERCIAL

## 2025-06-06 VITALS — HEIGHT: 65 IN | WEIGHT: 139 LBS | BODY MASS INDEX: 23.16 KG/M2

## 2025-06-06 PROCEDURE — 99213 OFFICE O/P EST LOW 20 MIN: CPT

## 2025-08-07 ENCOUNTER — APPOINTMENT (OUTPATIENT)
Dept: ORTHOPEDIC SURGERY | Facility: CLINIC | Age: 55
End: 2025-08-07
Payer: COMMERCIAL

## 2025-08-07 DIAGNOSIS — S83.281A OTHER TEAR OF LATERAL MENISCUS, CURRENT INJURY, RIGHT KNEE, INITIAL ENCOUNTER: ICD-10-CM

## 2025-08-07 DIAGNOSIS — M17.11 UNILATERAL PRIMARY OSTEOARTHRITIS, RIGHT KNEE: ICD-10-CM

## 2025-08-07 PROCEDURE — 20610 DRAIN/INJ JOINT/BURSA W/O US: CPT | Mod: RT

## 2025-08-07 PROCEDURE — 99213 OFFICE O/P EST LOW 20 MIN: CPT | Mod: 25
